# Patient Record
Sex: MALE | Race: WHITE | NOT HISPANIC OR LATINO | Employment: FULL TIME | URBAN - METROPOLITAN AREA
[De-identification: names, ages, dates, MRNs, and addresses within clinical notes are randomized per-mention and may not be internally consistent; named-entity substitution may affect disease eponyms.]

---

## 2019-06-10 ENCOUNTER — HOSPITAL ENCOUNTER (OUTPATIENT)
Dept: RADIOLOGY | Facility: MEDICAL CENTER | Age: 59
End: 2019-06-10
Attending: FAMILY MEDICINE
Payer: COMMERCIAL

## 2019-06-10 DIAGNOSIS — R07.9 CHEST PAIN, UNSPECIFIED TYPE: ICD-10-CM

## 2019-06-10 PROCEDURE — 71046 X-RAY EXAM CHEST 2 VIEWS: CPT

## 2019-08-19 ENCOUNTER — HOSPITAL ENCOUNTER (EMERGENCY)
Facility: MEDICAL CENTER | Age: 59
End: 2019-08-19
Attending: EMERGENCY MEDICINE
Payer: COMMERCIAL

## 2019-08-19 ENCOUNTER — APPOINTMENT (OUTPATIENT)
Dept: RADIOLOGY | Facility: MEDICAL CENTER | Age: 59
End: 2019-08-19
Attending: EMERGENCY MEDICINE
Payer: COMMERCIAL

## 2019-08-19 VITALS
WEIGHT: 211.64 LBS | TEMPERATURE: 98.2 F | OXYGEN SATURATION: 95 % | DIASTOLIC BLOOD PRESSURE: 92 MMHG | HEIGHT: 71 IN | SYSTOLIC BLOOD PRESSURE: 146 MMHG | HEART RATE: 102 BPM | BODY MASS INDEX: 29.63 KG/M2 | RESPIRATION RATE: 18 BRPM

## 2019-08-19 DIAGNOSIS — J20.8 ACUTE BRONCHITIS DUE TO OTHER SPECIFIED ORGANISMS: ICD-10-CM

## 2019-08-19 PROCEDURE — 71045 X-RAY EXAM CHEST 1 VIEW: CPT

## 2019-08-19 PROCEDURE — 99284 EMERGENCY DEPT VISIT MOD MDM: CPT

## 2019-08-19 RX ORDER — ALBUTEROL SULFATE 90 UG/1
2 AEROSOL, METERED RESPIRATORY (INHALATION) EVERY 4 HOURS PRN
Qty: 1 INHALER | Refills: 0 | Status: SHIPPED | OUTPATIENT
Start: 2019-08-19 | End: 2021-04-09

## 2019-08-19 SDOH — HEALTH STABILITY: MENTAL HEALTH: HOW OFTEN DO YOU HAVE A DRINK CONTAINING ALCOHOL?: NEVER

## 2019-08-19 NOTE — ED NOTES
Pt ambulates to triage  Chief Complaint   Patient presents with   • Cough     non productive since last wk thursday   • Epistaxis     not currently - every evening thurs   • Dizziness   Pt A & 0 x 4, speech clear, ambulates well  Pt asked to wait in lobby, pt updated on triage process and pt asked to inform RN of any changes.

## 2019-08-19 NOTE — ED PROVIDER NOTES
"ED Provider Note    CHIEF COMPLAINT  Chief Complaint   Patient presents with   • Cough     non productive since last wk thursday   • Epistaxis     not currently - every evening thurs   • Dizziness       HPI  Eloy Liu is a 59 y.o. male with a history of type 2 diabetes, hypertension, high cholesterol, who presents with 1 week of persistent nonproductive cough.  The patient had a fever last week of 101.  The patient denies any systemic symptoms, no headache, no muscle aches.  No runny nose no chest pain.  The patient describes shortness of breath at rest, no vomiting or diarrhea.  The patient is taking over-the-counter remedies with are not helping the problem.    REVIEW OF SYSTEMS  See HPI for further details. All other systems are negative.     PAST MEDICAL HISTORY   has a past medical history of Diabetes (HCC) (Type 2).    SOCIAL HISTORY  Social History     Tobacco Use   • Smoking status: Never Smoker   • Smokeless tobacco: Never Used   Substance and Sexual Activity   • Alcohol use: Never     Frequency: Never   • Drug use: Never   • Sexual activity: Not on file       SURGICAL HISTORY  patient denies any surgical history    CURRENT MEDICATIONS  Home Medications     Reviewed by Bobbi Drew (Pharmacy Tech) on 08/19/19 at 1304  Med List Status: Complete   Medication Last Dose Status   aspirin (ASA) 81 MG Chew Tab chewable tablet 8/18/2019 Active   lisinopril (PRINIVIL) 20 MG Tab 8/18/2019 Active   metformin (GLUCOPHAGE) 500 MG Tab 8/19/2019 Active   rosuvastatin (CRESTOR) 20 MG TABS 8/18/2019 Active                ALLERGIES  No Known Allergies    PHYSICAL EXAM  VITAL SIGNS: /92   Pulse (!) 102   Temp 36.8 °C (98.2 °F) (Temporal)   Resp 18   Ht 1.803 m (5' 11\")   Wt 96 kg (211 lb 10.3 oz)   SpO2 95%   BMI 29.52 kg/m²  @JAMAL[[id number]::@   Pulse ox interpretation: I interpret this pulse ox as normal.  Constitutional: Alert in no apparent distress.  HENT: No signs of trauma, Bilateral external " ears normal, Nose normal.   Ears: TMs clear bilaterally.   Nose: R nare trace blood.   Throat: Clear  Eyes: Pupils are equal and reactive, Conjunctiva normal, Non-icteric.   Neck: Normal range of motion, No tenderness, Supple, No stridor.   Lymphatic: No lymphadenopathy noted.   Cardiovascular: Regular rate and rhythm, no murmurs.   Thorax & Lungs: Clear to auscultation bilaterally.  Abdomen: Bowel sounds normal, Soft, No tenderness, No masses, No pulsatile masses. No peritoneal signs.  Skin: Warm, Dry, No erythema, No rash.   Back: No bony tenderness, No CVA tenderness.   Extremities: Intact distal pulses, No edema, No tenderness, No cyanosis.  Musculoskeletal: Good range of motion in all major joints. No tenderness to palpation or major deformities noted.   Neurologic: Alert , Normal motor function, Normal sensory function, No focal deficits noted.   Psychiatric: Affect normal, Judgment normal, Mood normal.       DIAGNOSTIC STUDIES / PROCEDURES      RADIOLOGY  DX-CHEST-PORTABLE (1 VIEW)   Final Result      No evidence of acute cardiopulmonary process.              COURSE & MEDICAL DECISION MAKING  Pertinent Labs & Imaging studies reviewed. (See chart for details)    Differential diagnosis: Bronchitis, pneumonia, allergic reaction, side effect from lisinopril    The patient's x-ray does not show pneumonia.  At this time the cough is been present for 1 week I told him if it becomes chronic they should consider switching from lisinopril.  I have prescribed an albuterol inhaler for symptomatic treatment.    The patient will return for new or worsening symptoms and is stable at the time of discharge.    The patient is referred to a primary physician for blood pressure management, diabetic screening, and for all other preventative health concerns.        DISPOSITION:  Patient will be discharged home in stable condition.    FOLLOW UP:  [facility], Emergency Dept  [address]  [facility]  [address]  [phone]    If symptoms worsen    Karel MARADIAGA M.D.  [address]  [place] [address] [phone]      As needed      OUTPATIENT MEDICATIONS:  New Prescriptions    ALBUTEROL 108 (90 BASE) MCG/ACT AERO SOLN INHALATION AEROSOL    Inhale 2 Puffs by mouth every four hours as needed for Shortness of Breath.         The patient will not drink alcohol nor drive with prescribed medications. The patient will return for worsening symptoms and is stable at the time of discharge. The patient verbalizes understanding and will comply.    FINAL IMPRESSION  1. Acute bronchitis due to other specified organisms                Electronically signed by: Fly Salmeron, 8/19/2019 12:33 PM

## 2020-04-27 ENCOUNTER — HOSPITAL ENCOUNTER (OUTPATIENT)
Dept: RADIOLOGY | Facility: MEDICAL CENTER | Age: 60
End: 2020-04-27
Attending: FAMILY MEDICINE
Payer: COMMERCIAL

## 2020-04-27 DIAGNOSIS — R07.9 CHEST PAIN, UNSPECIFIED TYPE: ICD-10-CM

## 2020-04-27 PROCEDURE — 71046 X-RAY EXAM CHEST 2 VIEWS: CPT

## 2020-06-20 ENCOUNTER — APPOINTMENT (OUTPATIENT)
Dept: RADIOLOGY | Facility: MEDICAL CENTER | Age: 60
End: 2020-06-20
Attending: FAMILY MEDICINE
Payer: COMMERCIAL

## 2020-07-10 ENCOUNTER — HOSPITAL ENCOUNTER (OUTPATIENT)
Dept: RADIOLOGY | Facility: MEDICAL CENTER | Age: 60
End: 2020-07-10
Attending: FAMILY MEDICINE
Payer: COMMERCIAL

## 2020-07-10 DIAGNOSIS — G89.29 CHRONIC INTRACTABLE HEADACHE, UNSPECIFIED HEADACHE TYPE: ICD-10-CM

## 2020-07-10 DIAGNOSIS — R51.9 CHRONIC INTRACTABLE HEADACHE, UNSPECIFIED HEADACHE TYPE: ICD-10-CM

## 2020-07-10 PROCEDURE — 70551 MRI BRAIN STEM W/O DYE: CPT

## 2020-11-28 ENCOUNTER — OFFICE VISIT (OUTPATIENT)
Dept: URGENT CARE | Facility: CLINIC | Age: 60
End: 2020-11-28
Payer: COMMERCIAL

## 2020-11-28 ENCOUNTER — HOSPITAL ENCOUNTER (OUTPATIENT)
Facility: MEDICAL CENTER | Age: 60
End: 2020-11-28
Attending: PHYSICIAN ASSISTANT
Payer: COMMERCIAL

## 2020-11-28 VITALS
TEMPERATURE: 97.8 F | WEIGHT: 210 LBS | RESPIRATION RATE: 16 BRPM | BODY MASS INDEX: 29.4 KG/M2 | OXYGEN SATURATION: 97 % | HEIGHT: 71 IN | SYSTOLIC BLOOD PRESSURE: 134 MMHG | DIASTOLIC BLOOD PRESSURE: 82 MMHG | HEART RATE: 84 BPM

## 2020-11-28 DIAGNOSIS — R05.9 COUGH: ICD-10-CM

## 2020-11-28 PROCEDURE — 99214 OFFICE O/P EST MOD 30 MIN: CPT | Performed by: PHYSICIAN ASSISTANT

## 2020-11-28 PROCEDURE — U0003 INFECTIOUS AGENT DETECTION BY NUCLEIC ACID (DNA OR RNA); SEVERE ACUTE RESPIRATORY SYNDROME CORONAVIRUS 2 (SARS-COV-2) (CORONAVIRUS DISEASE [COVID-19]), AMPLIFIED PROBE TECHNIQUE, MAKING USE OF HIGH THROUGHPUT TECHNOLOGIES AS DESCRIBED BY CMS-2020-01-R: HCPCS

## 2020-11-28 RX ORDER — ALPRAZOLAM 0.5 MG/1
TABLET ORAL
COMMUNITY
End: 2021-04-09

## 2020-11-28 RX ORDER — ALPRAZOLAM 1 MG/1
TABLET ORAL
COMMUNITY

## 2020-11-28 RX ORDER — SILDENAFIL 100 MG/1
TABLET, FILM COATED ORAL
COMMUNITY

## 2020-11-28 RX ORDER — LISINOPRIL 10 MG/1
TABLET ORAL
COMMUNITY
End: 2021-04-09

## 2020-11-28 RX ORDER — TAMSULOSIN HYDROCHLORIDE 0.4 MG/1
CAPSULE ORAL
COMMUNITY

## 2020-11-28 RX ORDER — ROSUVASTATIN CALCIUM 5 MG/1
TABLET, COATED ORAL
COMMUNITY
End: 2021-04-09

## 2020-11-28 ASSESSMENT — ENCOUNTER SYMPTOMS
CHILLS: 0
SHORTNESS OF BREATH: 0
CONSTIPATION: 0
ABDOMINAL PAIN: 0
HEADACHES: 0
VOMITING: 0
DIARRHEA: 0
NAUSEA: 0
EYE PAIN: 0
MYALGIAS: 0
SORE THROAT: 1
COUGH: 1
FEVER: 0

## 2020-11-28 NOTE — LETTER
November 28, 2020    To Whom It May Concern:         This is confirmation that Eloy Liu attended his scheduled appointment with Mamadou Braun P.A.-C. on 11/28/20.   Your employee was seen in our clinic today.  A concern for COVID-19 has been identified and testing is in progress. We are asking you to excuse absences while following self-isolation protocol per Center for Disease Control (CDC) guidelines.  Your employee will be able to access test results through our electronic delivery system called [employer].     If the results of testing are positive, your employee should follow the CDC guidelines.  These are isolation for a minimum of 10 days and at least 24 hours have passed since your last fever without the use of fever-reducing medications and all other symptoms have improved.  The health department may contact you and provide further directions regarding self-isolation and return to work.     Negative result without close contact*:  If your employee was tested due to their symptoms without close contact to someone with COVID-19 and their test is negative: your employee should not return to work or regular activities until 24 hours after symptoms fully improve. (For example, if patient feels back to normal on Tuesday, should remain isolated through Wednesday).      Negative with close contact*:  If your employee was tested due to close contact to someone, they should self isolate for 14 days after their exposure.    Negative with positive household member  If your employee was due to a positive household member they should still quarantine for a period of 14 days.  The 14 day period begins once the household member is isolated. If unable to quarantine (for example mom from infant), the CDC advises an additional 14-day quarantine period from the COVID-19 household member.   In general, repeat testing is not necessary and not offered through our [facility] urgent care.     This is the only note that  will be provided from [employer] for this visit.  Your employee will require an appointment with a primary care provider if FMLA or disability forms are required.  If you have any questions please do not hesitate to call me at the phone number listed below.    Sincerely,    PETER Champagne.-C.  [phone]

## 2020-11-29 DIAGNOSIS — R05.9 COUGH: ICD-10-CM

## 2020-11-29 NOTE — PROGRESS NOTES
"Subjective:   Eloy Liu is a 60 y.o. male who presents for Cough (x2 days, mild cough, runny nose, no fevers )      This is a 60-year-old male with a history of diabetes presenting complaining of 2 to 3 days of \"cold symptoms\".  Patient reports he gets a similar cold around this time every year with runny nose, cough, general malaise and this feels similar.  He notes he has several coworkers with household members positive for Covid which she has been ample time with.  He personally has not felt any fever/chills, nausea/diarrhea, shortness of breath.  He describes his symptoms as very mild, he has not required any OTC medications      Review of Systems   Constitutional: Positive for malaise/fatigue. Negative for chills and fever.   HENT: Positive for congestion and sore throat. Negative for ear pain.    Eyes: Negative for pain.   Respiratory: Positive for cough. Negative for shortness of breath.    Cardiovascular: Negative for chest pain.   Gastrointestinal: Negative for abdominal pain, constipation, diarrhea, nausea and vomiting.   Genitourinary: Negative for dysuria.   Musculoskeletal: Negative for myalgias.   Skin: Negative for rash.   Neurological: Negative for headaches.       Medications:    • albuterol Aers  • ALPRAZolam Tabs  • aspirin Chew  • Empagliflozin Tabs  • lisinopril Tabs  • metFORMIN Tabs  • rosuvastatin Tabs  • sildenafil citrate  • tamsulosin    Allergies: Patient has no known allergies.    Problem List: Eloy Liu has Hyperglycemia; Chest pain; HTN (hypertension); DM2 (diabetes mellitus, type 2) (HCC); HLD (hyperlipidemia); and Bronchitis on their problem list.    Surgical History:  No past surgical history on file.    Past Social Hx: Eloy Liu  reports that he has never smoked. He has never used smokeless tobacco. He reports that he does not drink alcohol or use drugs.     Past Family Hx:  Eloy Liu family history includes Heart Disease in his mother; Stroke in his " "father.     Problem list, medications, and allergies reviewed by myself today in Epic.     Objective:     /82 (Patient Position: Sitting)   Pulse 84   Temp 36.6 °C (97.8 °F) (Temporal)   Resp 16   Ht 1.803 m (5' 11\")   Wt 95.3 kg (210 lb)   SpO2 97%   BMI 29.29 kg/m²     Physical Exam  Vitals signs reviewed.   Constitutional:       Appearance: Normal appearance.   HENT:      Head: Normocephalic and atraumatic.      Right Ear: External ear normal.      Left Ear: External ear normal.      Nose: Nose normal.      Mouth/Throat:      Mouth: Mucous membranes are moist.   Eyes:      Conjunctiva/sclera: Conjunctivae normal.   Cardiovascular:      Rate and Rhythm: Normal rate and regular rhythm.      Heart sounds: Normal heart sounds.   Pulmonary:      Effort: Pulmonary effort is normal.      Breath sounds: Normal breath sounds.   Skin:     General: Skin is warm and dry.      Capillary Refill: Capillary refill takes less than 2 seconds.   Neurological:      Mental Status: He is alert and oriented to person, place, and time.         Assessment/Plan:     Diagnosis and associated orders:     1. Cough  COVID/SARS COV-2 PCR      Comments/MDM:     Patient's vital signs are reassuring and they appear hemodynamically stable and do not require higher level care at this time  I discussed self isolation and provided printed instructions (if applicable)  I discussed ER precautions and provided printed instructions (if applicable)  I educated the patient on possibility of a false-negative test and indications for repeat testing  I instructed the patient to try to follow up with their PCP (if applicable) for follow up care  I provided the patient the printed AVS which contains information about signing up for "[employer]"t   I will contact the patient via [employer] with Covid results.  If requested, I provided the patient with a work note to provide to their employer or school regarding returning to work and discontinuation of self " isolation.  All questions were answered and patient demonstrated verbal understanding of above.  I followed all reasonable PPE precautions during this encounter including but not limited to use of an N95 mask, gloves, and gown if indicated.    •          Differential diagnosis, natural history, supportive care, and indications for immediate follow-up discussed.    Advised the patient to follow-up with the primary care physician for recheck, reevaluation, and consideration of further management.    Please note that this dictation was created using voice recognition software. I have made a reasonable attempt to correct obvious errors, but I expect that there are errors of grammar and possibly content that I did not discover before finalizing the note.    This note was electronically signed by Mamadou Braun PA-C

## 2020-11-30 LAB
COVID ORDER STATUS COVID19: NORMAL
SARS-COV-2 RNA RESP QL NAA+PROBE: NOTDETECTED
SPECIMEN SOURCE: NORMAL

## 2021-03-15 DIAGNOSIS — Z23 NEED FOR VACCINATION: ICD-10-CM

## 2021-04-09 ENCOUNTER — HOSPITAL ENCOUNTER (OUTPATIENT)
Dept: RADIOLOGY | Facility: MEDICAL CENTER | Age: 61
End: 2021-04-09
Attending: PHYSICIAN ASSISTANT
Payer: COMMERCIAL

## 2021-04-09 ENCOUNTER — OFFICE VISIT (OUTPATIENT)
Dept: URGENT CARE | Facility: CLINIC | Age: 61
End: 2021-04-09
Payer: COMMERCIAL

## 2021-04-09 ENCOUNTER — APPOINTMENT (OUTPATIENT)
Dept: RADIOLOGY | Facility: IMAGING CENTER | Age: 61
End: 2021-04-09
Attending: PHYSICIAN ASSISTANT
Payer: COMMERCIAL

## 2021-04-09 VITALS
SYSTOLIC BLOOD PRESSURE: 142 MMHG | WEIGHT: 205 LBS | DIASTOLIC BLOOD PRESSURE: 96 MMHG | TEMPERATURE: 98.8 F | HEIGHT: 71 IN | HEART RATE: 87 BPM | RESPIRATION RATE: 18 BRPM | OXYGEN SATURATION: 97 % | BODY MASS INDEX: 28.7 KG/M2

## 2021-04-09 DIAGNOSIS — M25.561 ACUTE PAIN OF RIGHT KNEE: ICD-10-CM

## 2021-04-09 DIAGNOSIS — S09.93XA FACIAL INJURY, INITIAL ENCOUNTER: ICD-10-CM

## 2021-04-09 DIAGNOSIS — S02.85XA CLOSED FRACTURE OF ORBIT, INITIAL ENCOUNTER (HCC): ICD-10-CM

## 2021-04-09 PROCEDURE — 73564 X-RAY EXAM KNEE 4 OR MORE: CPT | Mod: TC,FY,RT | Performed by: PHYSICIAN ASSISTANT

## 2021-04-09 PROCEDURE — 70486 CT MAXILLOFACIAL W/O DYE: CPT

## 2021-04-09 PROCEDURE — 99215 OFFICE O/P EST HI 40 MIN: CPT | Performed by: PHYSICIAN ASSISTANT

## 2021-04-09 RX ORDER — ASPIRIN 81 MG/1
TABLET ORAL
COMMUNITY
End: 2021-04-09

## 2021-04-09 RX ORDER — AMOXICILLIN AND CLAVULANATE POTASSIUM 875; 125 MG/1; MG/1
1 TABLET, FILM COATED ORAL 2 TIMES DAILY
Qty: 14 TABLET | Refills: 0 | Status: SHIPPED | OUTPATIENT
Start: 2021-04-09 | End: 2021-04-16

## 2021-04-09 RX ORDER — METFORMIN HYDROCHLORIDE 500 MG/1
TABLET, EXTENDED RELEASE ORAL
COMMUNITY
Start: 2021-01-20

## 2021-04-09 RX ORDER — BLOOD SUGAR DIAGNOSTIC
STRIP MISCELLANEOUS
COMMUNITY
Start: 2021-04-02

## 2021-04-09 RX ORDER — LANCETS 33 GAUGE
EACH MISCELLANEOUS
COMMUNITY
Start: 2021-04-02

## 2021-04-09 ASSESSMENT — ENCOUNTER SYMPTOMS
EYE REDNESS: 1
DIZZINESS: 0
LOSS OF CONSCIOUSNESS: 1
EYE DISCHARGE: 0
DOUBLE VISION: 0
HEADACHES: 1
PALPITATIONS: 0
EYE PAIN: 0
BLURRED VISION: 0

## 2021-04-09 ASSESSMENT — VISUAL ACUITY: OU: 1

## 2021-04-09 NOTE — PROGRESS NOTES
Subjective:   Eloy Liu is a 61 y.o. male who presents today with   Chief Complaint   Patient presents with   • Facial Swelling     x last week, fell on right side, red (R) eye    • Knee Pain     (R) knee        Fall  The accident occurred 5 to 7 days ago. The fall occurred while standing. He fell from a height of 1 to 2 ft. He landed on concrete. The point of impact was the right knee and face. The pain is present in the face. Associated symptoms include headaches (Mild and improving) and a loss of consciousness.     Patient states he drank approximately 2 mixed drinks and a couple of shots without having anything to eat last Friday and passed out for few seconds hitting the ground injuring his right knee and right side of his face.  PMH:  has a past medical history of Diabetes (HCC) (Type 2).  MEDS:   Current Outpatient Medications:   •  metFORMIN ER (GLUCOPHAGE XR) 500 MG TABLET SR 24 HR, , Disp: , Rfl:   •  Lancets (ONETOUCH DELICA PLUS [id number]) Misc, , Disp: , Rfl:   •  ONETOUCH VERIO strip, , Disp: , Rfl:   •  amoxicillin-clavulanate (AUGMENTIN) 875-125 MG Tab, Take 1 tablet by mouth 2 times a day for 7 days., Disp: 14 tablet, Rfl: 0  •  ALPRAZolam (XANAX) 1 MG Tab, alprazolam 1 mg tablet, Disp: , Rfl:   •  sildenafil citrate (VIAGRA) 100 MG tablet, sildenafil 100 mg tablet, Disp: , Rfl:   •  tamsulosin (FLOMAX) 0.4 MG capsule, tamsulosin 0.4 mg capsule, Disp: , Rfl:   •  metFORMIN (GLUCOPHAGE) 500 MG Tab, metformin 500 mg tablet   2 tablets twice a day by oral route., Disp: , Rfl:   •  metformin (GLUCOPHAGE) 500 MG Tab, Take 1,000 mg by mouth 2 times a day, with meals., Disp: , Rfl:   •  lisinopril (PRINIVIL) 20 MG Tab, Take 20 mg by mouth every bedtime., Disp: , Rfl:   •  aspirin (ASA) 81 MG Chew Tab chewable tablet, Take 81 mg by mouth every bedtime., Disp: , Rfl:   •  rosuvastatin (CRESTOR) 20 MG TABS, Take 20 mg by mouth every bedtime., Disp: , Rfl:   ALLERGIES: No Known Allergies  SURGHX: No  "past surgical history on file.  SOCHX:  reports that he has never smoked. He has never used smokeless tobacco. He reports current alcohol use. He reports that he does not use drugs.  FH: Reviewed with patient, not pertinent to this visit.       Review of Systems   Eyes: Positive for redness. Negative for blurred vision, double vision, pain and discharge.   Cardiovascular: Negative for chest pain and palpitations.   Neurological: Positive for loss of consciousness and headaches (Mild and improving). Negative for dizziness.        Objective:   /96   Pulse 87   Temp 37.1 °C (98.8 °F) (Temporal)   Resp 18   Ht 1.803 m (5' 11\")   Wt 93 kg (205 lb)   SpO2 97%   BMI 28.59 kg/m²   Physical Exam  Vitals and nursing note reviewed.   Constitutional:       General: He is not in acute distress.     Appearance: Normal appearance. He is well-developed. He is not ill-appearing or toxic-appearing.   HENT:      Head: Normocephalic and atraumatic. No raccoon eyes or Vasquez's sign.        Comments: Tenderness to palpation to the facial bones of the lateral portion of the right face and under the eye.     Right Ear: Hearing normal.      Left Ear: Hearing normal.      Nose: No nasal deformity, nasal tenderness or rhinorrhea.      Right Nostril: No septal hematoma.      Left Nostril: No septal hematoma.      Right Sinus: Maxillary sinus tenderness present.   Eyes:      General: Vision grossly intact.      Extraocular Movements: Extraocular movements intact.      Conjunctiva/sclera: Conjunctivae normal.      Pupils: Pupils are equal, round, and reactive to light.        Comments: Subconjunctival hemorrhage to the lateral portion of the right eye.No signs of entrapment or pain with eye movement.    Cardiovascular:      Rate and Rhythm: Normal rate and regular rhythm.      Heart sounds: Normal heart sounds.   Pulmonary:      Effort: Pulmonary effort is normal.   Musculoskeletal:      Comments:  patient is using a cane for " ambulation.  Patient has normal range of motion in the upper extremities and normal strength.  Slight pain with flexion of the right knee.  Tenderness to palpation to the lateral portion of the right knee and bony anterior tenderness.  No deformity or dislocation.  Negative valgus and varus motion.  Negative anterior drawer.   Skin:     General: Skin is warm and dry.             Comments: Scabbed area of the anterior portion of the right knee.  No surrounding erythema.  No drainage or discharge.   Neurological:      General: No focal deficit present.      Mental Status: He is alert and oriented to person, place, and time.      GCS: GCS eye subscore is 4. GCS verbal subscore is 5. GCS motor subscore is 6.      Cranial Nerves: Cranial nerves are intact.      Sensory: Sensation is intact.      Motor: Motor function is intact.      Coordination: Coordination is intact. Coordination normal.   Psychiatric:         Mood and Affect: Mood normal.         DX KNEE  FINDINGS:  Bone density is normal. There is no evidence of fracture or dislocation. There is no evidence of arthropathy. There is no joint effusion. Fell 2 weeks ago.     IMPRESSION:     No evidence of acute fracture or dislocation.    CT MAXILLOFACIAL  FINDINGS:     The mandible is intact.  No pterygoid plate or zygomatic arch fracture is present.  There is an acute minimally displaced fracture of the medial aspect of the anterior wall of the right maxillary sinus.  There is associated mucosal thickening and partial opacification of the right maxillary sinus.  The lateral and medial walls are intact.  There is an acute displaced fracture of the floor of the right orbit.  There is a minimally displaced fracture of the superior aspect of the lateral wall of the right orbit.  The medial wall of the orbit is intact.  The globe is intact.  There is minimal soft tissue swelling/hematoma anterior to the right orbit.  The nasal bone is  intact.           IMPRESSION:        1.  Acute fractures of the lateral wall and floor of the right orbit. Clinical correlation is recommended to exclude extraocular muscular entrapment.     2.  Acute minimally displaced fracture of the anterior wall of the right maxillary sinus with associated mucosal thickening or hemorrhage within the right maxillary sinus antrum.     3.  No evidence of right globe injury.    Assessment/Plan:   Assessment    1. Acute pain of right knee  - DX-KNEE COMPLETE 4+ RIGHT; Future    2. Facial injury, initial encounter  - CT-MAXILLOFACIAL W/O PLUS RECONS; Future    3. Closed fracture of orbit, initial encounter ([facility])  - REFERRAL TO ORAL MAXILLOFACIAL SURGERY  - amoxicillin-clavulanate (AUGMENTIN) 875-125 MG Tab; Take 1 tablet by mouth 2 times a day for 7 days.  Dispense: 14 tablet; Refill: 0  - Tdap =>6yo IM    Other orders  - metFORMIN ER (GLUCOPHAGE XR) 500 MG TABLET SR 24 HR  - Lancets (ONETOUCH DELICA PLUS [id number]) Misc  - ONETOUCH VERIO strip  Patient is sure that his cause of passing out is from alcohol use.  Did discuss with him that if it were for any other reason he would need ER evaluation to understand why he had syncopal episode but alcohol as the cause has been identified per patient's history and he has no other concerns.  He is also agreeable to having CT scan to rule out any facial fractures.  X-ray today to rule out any fracture of the right knee. NO signs of entrapment or acute neuro findings on exam today.   CT did show fracture. Called and discussed treating with abx. Patient would like to come back for tetanus tomorrow. Will give abx and discussed to not blow his nose. Red flag signs for ER follow up discussed including any vision changes or pain with eye movement. Follow up with maxillofacial surgery before going back to work.   Differential diagnosis, natural history, supportive care, and indications for immediate follow-up discussed.   Patient given instructions  and understanding of medications and treatment.    If not improving in 3-5 days, F/U with PCP or return to UC if symptoms worsen.  Strict ER precautions given.  Patient agreeable to plan.  Greater than 45 minutes were spent reviewing patient's chart, examining and obtaining history from patient, and discussing plan of care.     Please note that this dictation was created using voice recognition software. I have made every reasonable attempt to correct obvious errors, but I expect that there are errors of grammar and possibly content that I did not discover before finalizing the note.    Nitesh Miles PA-C

## 2021-04-09 NOTE — LETTER
April 9, 2021         Patient: Eloy Liu   YOB: 1960   Date of Visit: 4/9/2021           To Whom it May Concern:    Eloy Liu was seen in my clinic on 4/9/2021. He can return to work for 4/14/21.  Please excuse his recent absence.  If you have any questions or concerns, please don't hesitate to call.        Sincerely,           Nitesh Miles P.A.-C.  Electronically Signed

## 2021-04-10 NOTE — NON-PROVIDER
"Eloy Liu is a 61 y.o. male here for a non-provider visit for:   TDAP    Reason for immunization: Overdue/Provider Recommended  Immunization records indicate need for vaccine: Yes, confirmed with personal records  Minimum interval has been met for this vaccine: Yes  ABN completed: Not Indicated    Order and dose verified by:   VIS Dated  04/01/2020 was given to patient: Yes  All IAC Questionnaire questions were answered \"No.\"    Patient tolerated injection and no adverse effects were observed or reported: Yes    Pt scheduled for next dose in series: Not Indicated    "

## 2021-04-12 ENCOUNTER — TELEPHONE (OUTPATIENT)
Dept: URGENT CARE | Facility: CLINIC | Age: 61
End: 2021-04-12

## 2021-04-12 DIAGNOSIS — S02.85XA CLOSED FRACTURE OF ORBIT, INITIAL ENCOUNTER (HCC): ICD-10-CM

## 2021-04-12 NOTE — TELEPHONE ENCOUNTER
Pt call stating that the referral you put in for him doesn't take insurance, he contacted somewhere else but they do not work on facial surgery. He was hoping we can help him find a surgeon who can help, also mentioned that he wont be able to return to work until this resolve and requesting a letter for work.     ~Shelo~    Please notify patient he should be contacted for other options of surgeons with referrals given today. If not then he should go to the ER.

## 2021-04-19 ENCOUNTER — TELEPHONE (OUTPATIENT)
Dept: URGENT CARE | Facility: CLINIC | Age: 61
End: 2021-04-19

## 2021-04-19 NOTE — TELEPHONE ENCOUNTER
Called patient again to follow-up to see if he was able to be seen by a surgeon.  Discussed with him that referrals were placed.  He states that he has called all of them and they all have excuses that they do not take his insurance or do not want to do work around his eye.  He states that he will allow it to heal on its own.  I strongly recommended against this and still recommend he follow-up with a surgeon or go to the ER.  He is understanding of this and still states he would like to heal on his own.

## 2025-02-12 ENCOUNTER — TELEPHONE (OUTPATIENT)
Dept: HEALTH INFORMATION MANAGEMENT | Facility: OTHER | Age: 65
End: 2025-02-12
Payer: MEDICAID